# Patient Record
Sex: FEMALE | Race: WHITE | NOT HISPANIC OR LATINO | URBAN - METROPOLITAN AREA
[De-identification: names, ages, dates, MRNs, and addresses within clinical notes are randomized per-mention and may not be internally consistent; named-entity substitution may affect disease eponyms.]

---

## 2019-04-22 ENCOUNTER — OFFICE (OUTPATIENT)
Dept: URBAN - METROPOLITAN AREA CLINIC 14 | Facility: CLINIC | Age: 44
End: 2019-04-22

## 2019-04-22 VITALS
HEART RATE: 77 BPM | DIASTOLIC BLOOD PRESSURE: 62 MMHG | SYSTOLIC BLOOD PRESSURE: 114 MMHG | HEIGHT: 67 IN | RESPIRATION RATE: 16 BRPM | WEIGHT: 192 LBS

## 2019-04-22 DIAGNOSIS — Z87.11 PERSONAL HISTORY OF PEPTIC ULCER DISEASE: ICD-10-CM

## 2019-04-22 DIAGNOSIS — K59.00 CONSTIPATION, UNSPECIFIED: ICD-10-CM

## 2019-04-22 LAB
H PYLORI BREATH TEST: NEGATIVE
H. PYLORI BREATH COLLECTION: (no result)

## 2019-04-22 PROCEDURE — 99202 OFFICE O/P NEW SF 15 MIN: CPT | Performed by: INTERNAL MEDICINE

## 2019-04-22 NOTE — SERVICEHPINOTES
43-year-old white female reports history of chronic constipation for the past 12 years.  She tried taking MiraLax and this did not seem to help much.  Taking Ex-Lax on a once a week basis.  Reports that maternal aunt was recently diagnosed with colon cancer.  No family history of colon cancer or colon polyps in her first-degree relatives.  Denies any rectal bleeding.  Drinks sodas rarely.  Eats a healthy diet.  Reports increase in bloating whenever she tries to increase fiber in her diet.  Reports remote history of peptic ulcer disease.  Unsure if she was treated for H pylori at that time.  Ultrasound of the abdomen was normal in 2011.

## 2019-04-22 NOTE — SERVICENOTES
Discussed with patient regarding proceeding with a colonoscopy if she notices rectal bleeding or unexplained weight loss.